# Patient Record
Sex: FEMALE | Race: WHITE | NOT HISPANIC OR LATINO | Employment: OTHER | URBAN - METROPOLITAN AREA
[De-identification: names, ages, dates, MRNs, and addresses within clinical notes are randomized per-mention and may not be internally consistent; named-entity substitution may affect disease eponyms.]

---

## 2017-01-01 ENCOUNTER — TELEPHONE (OUTPATIENT)
Dept: RHEUMATOLOGY | Facility: CLINIC | Age: 72
End: 2017-01-01

## 2017-01-01 ENCOUNTER — PATIENT MESSAGE (OUTPATIENT)
Dept: RHEUMATOLOGY | Facility: CLINIC | Age: 72
End: 2017-01-01

## 2017-01-01 ENCOUNTER — OFFICE VISIT (OUTPATIENT)
Dept: RHEUMATOLOGY | Facility: CLINIC | Age: 72
End: 2017-01-01
Payer: MEDICARE

## 2017-01-01 ENCOUNTER — LAB VISIT (OUTPATIENT)
Dept: LAB | Facility: HOSPITAL | Age: 72
End: 2017-01-01
Attending: INTERNAL MEDICINE
Payer: MEDICARE

## 2017-01-01 VITALS
HEART RATE: 72 BPM | DIASTOLIC BLOOD PRESSURE: 70 MMHG | HEIGHT: 62 IN | WEIGHT: 119.25 LBS | SYSTOLIC BLOOD PRESSURE: 140 MMHG | BODY MASS INDEX: 21.94 KG/M2

## 2017-01-01 DIAGNOSIS — R53.83 MALAISE AND FATIGUE: ICD-10-CM

## 2017-01-01 DIAGNOSIS — R53.81 MALAISE AND FATIGUE: ICD-10-CM

## 2017-01-01 DIAGNOSIS — M05.79 RHEUMATOID ARTHRITIS INVOLVING MULTIPLE SITES WITH POSITIVE RHEUMATOID FACTOR: Primary | ICD-10-CM

## 2017-01-01 DIAGNOSIS — J44.9 CHRONIC OBSTRUCTIVE PULMONARY DISEASE, UNSPECIFIED COPD TYPE: ICD-10-CM

## 2017-01-01 DIAGNOSIS — M05.711 RHEUMATOID ARTHRITIS INVOLVING RIGHT SHOULDER WITH POSITIVE RHEUMATOID FACTOR: ICD-10-CM

## 2017-01-01 DIAGNOSIS — M05.79 RHEUMATOID ARTHRITIS INVOLVING MULTIPLE SITES WITH POSITIVE RHEUMATOID FACTOR: ICD-10-CM

## 2017-01-01 DIAGNOSIS — E11.51 TYPE 2 DIABETES MELLITUS WITH DIABETIC PERIPHERAL ANGIOPATHY WITHOUT GANGRENE, UNSPECIFIED LONG TERM INSULIN USE STATUS: ICD-10-CM

## 2017-01-01 LAB
HAV IGM SERPL QL IA: NEGATIVE
HBV CORE IGM SERPL QL IA: NEGATIVE
HBV SURFACE AG SERPL QL IA: NEGATIVE
HCV AB SERPL QL IA: NEGATIVE

## 2017-01-01 PROCEDURE — 99214 OFFICE O/P EST MOD 30 MIN: CPT | Mod: S$PBB,,, | Performed by: INTERNAL MEDICINE

## 2017-01-01 PROCEDURE — 99999 PR PBB SHADOW E&M-EST. PATIENT-LVL III: CPT | Mod: PBBFAC,,, | Performed by: INTERNAL MEDICINE

## 2017-01-01 PROCEDURE — 36415 COLL VENOUS BLD VENIPUNCTURE: CPT | Mod: PO

## 2017-01-01 PROCEDURE — 99213 OFFICE O/P EST LOW 20 MIN: CPT | Mod: PBBFAC,PO | Performed by: INTERNAL MEDICINE

## 2017-01-01 PROCEDURE — 80074 ACUTE HEPATITIS PANEL: CPT

## 2017-01-01 RX ORDER — NAPROXEN SODIUM 220 MG
TABLET ORAL
Refills: 6 | COMMUNITY
Start: 2016-01-01 | End: 2017-01-01 | Stop reason: SDUPTHER

## 2017-01-01 RX ORDER — LEFLUNOMIDE 20 MG/1
20 TABLET ORAL DAILY
Qty: 30 TABLET | Refills: 3 | Status: SHIPPED | OUTPATIENT
Start: 2017-01-01 | End: 2017-01-01

## 2017-01-01 RX ORDER — LEFLUNOMIDE 20 MG/1
TABLET ORAL
Qty: 30 TABLET | Refills: 3 | Status: SHIPPED | OUTPATIENT
Start: 2017-01-01 | End: 2017-01-01

## 2017-01-01 RX ORDER — OXYCODONE HYDROCHLORIDE 15 MG/1
TABLET ORAL
Refills: 0 | COMMUNITY
Start: 2016-01-01 | End: 2017-01-01

## 2017-01-01 RX ORDER — LEFLUNOMIDE 20 MG/1
20 TABLET ORAL DAILY
Qty: 30 TABLET | Refills: 3 | Status: SHIPPED | OUTPATIENT
Start: 2017-01-01 | End: 2017-01-01 | Stop reason: SDUPTHER

## 2017-01-01 RX ORDER — LEFLUNOMIDE 20 MG/1
TABLET ORAL
Qty: 30 TABLET | Refills: 3 | Status: SHIPPED | OUTPATIENT
Start: 2017-01-01 | End: 2017-01-01 | Stop reason: SDUPTHER

## 2017-01-01 RX ORDER — HYDROXYCHLOROQUINE SULFATE 200 MG/1
TABLET, FILM COATED ORAL
Qty: 60 TABLET | Refills: 3 | Status: SHIPPED | OUTPATIENT
Start: 2017-01-01

## 2017-01-01 ASSESSMENT — ROUTINE ASSESSMENT OF PATIENT INDEX DATA (RAPID3)
MDHAQ FUNCTION SCORE: .6
PAIN SCORE: 4
PATIENT GLOBAL ASSESSMENT SCORE: 4
PSYCHOLOGICAL DISTRESS SCORE: 0
TOTAL RAPID3 SCORE: 3.33

## 2017-01-16 NOTE — MR AVS SNAPSHOT
[facility]  [address]  [facility] [address]  Phone: [phone]  Fax: [phone]                  Elaine PACHECO Hernan   2017 3:00 PM   Office Visit    Description:  Female : 1945   Provider:  Nirali Real DO   Department:  [place] - Rheumatology           Reason for Visit     Disease Management           Diagnoses this Visit        Comments    Rheumatoid arthritis involving multiple sites with positive rheumatoid factor    -  Primary     Malaise and fatigue         Chronic obstructive pulmonary disease, unspecified COPD type         Type 2 diabetes mellitus with diabetic peripheral angiopathy without gangrene, unspecified long term insulin use status                To Do List           Future Appointments        Provider Department Dept Phone    3/16/2017 1:00 PM [facility], [place] [facility] [phone]    2017 10:30 AM Nirali Real DO [facility] [phone]      Goals (5 Years of Data)     None      Follow-Up and Disposition     Return in about 3 months (around 2017).       These Medications        Disp Refills Start End    leflunomide (ARAVA) 20 MG Tab 30 tablet 3 2017 2/15/2017    Take 1 tablet (20 mg total) by mouth once daily. - Oral    Pharmacy: [facility] [address] [place] [address] AT [facility] Ph #: [phone]         [facility]     [facility] Care [facility] -  Assistance  Registered nurses in the [facility] provide clinical advisement, health education, appointment booking, and other advisory services.  Call for this free service at [phone].             Medications           Message regarding Medications     Verify the changes and/or additions to your medication regime listed below are the same as discussed with your clinician today.  If any of these changes or additions are incorrect, please notify your healthcare  "provider.        START taking these NEW medications        Refills    leflunomide (ARAVA) 20 MG Tab 3    Sig: Take 1 tablet (20 mg total) by mouth once daily.    Class: Normal    Route: Oral      STOP taking these medications     oxycodone-acetaminophen (PERCOCET)  mg per tablet Take 1 tablet by mouth Daily.            Verify that the below list of medications is an accurate representation of the medications you are currently taking.  If none reported, the list may be blank. If incorrect, please contact your healthcare provider. Carry this list with you in case of emergency.           Current Medications     amitriptyline (ELAVIL) 150 MG Tab Take 1 tablet (150 mg total) by mouth every evening.    ammonium lactate (LAC-HYDRIN) 12 % lotion JOSSIE TO FEET D    atorvastatin (LIPITOR) 10 MG tablet Take 10 mg by mouth once daily.     BD INSULIN SYRINGE ULTRA-FINE 1 mL 31 x 5/16" Syrg AS DIRECTED    BD INSULIN SYRINGE ULTRA-FINE 1/2 mL 30 gauge x 1/2" Syrg USE UTD    clopidogrel (PLAVIX) 75 mg tablet Take 75 mg by mouth once daily. P.m.    diazepam (VALIUM) 5 MG tablet Take 5 mg by mouth every 12 (twelve) hours as needed.     digoxin (DIGOX) 125 mcg tablet Take 1 tablet (125 mcg total) by mouth once daily.    DILT- mg CDCR TAKE 1 CAPSULE BY MOUTH DAILY    diphenoxylate-atropine 2.5-0.025 mg (LOMOTIL) 2.5-0.025 mg per tablet Take 1 tablet by mouth 4 (four) times daily as needed for Diarrhea.    ECOTRIN LOW STRENGTH 81 mg EC tablet Take 81 mg by mouth once daily. A.m.    fenofibrate (TRICOR) 145 MG tablet TAKE ONE TABLET BY MOUTH EVERY DAY    FLORANEX 1 million cell Tab Take 1 tablet by mouth 3 (three) times daily with meals.    fluticasone-salmeterol 250-50 mcg/dose (ADVAIR DISKUS) 250-50 mcg/dose diskus inhaler INHALE 1 PUFF BY MOUTH EVERY 12 HOURS    HUMULIN R 100 unit/mL injection INJECT 4 TO 9 UNITS BEFORE EACH MEAL    hydroxychloroquine (PLAQUENIL) 200 mg tablet Take 1 tablet (200 mg total) by mouth 2 (two) " "times daily.    insulin syringe-needle U-100 0.5 mL 31 gauge x 5/16 Syrg USE TID    isosorbide mononitrate (IMDUR) 30 MG 24 hr tablet TAKE 1 TABLET BY MOUTH DAILY    LANTUS 100 unit/mL injection 60 UNITS SUBCUTANEOUSLY EVERY EVENING    levalbuterol (XOPENEX HFA) 45 mcg/actuation inhaler Inhale 1-2 puffs into the lungs every 4 (four) hours as needed for Wheezing.    whtyoybkm-[id number]-algal oil 3 mg-35 mg-2 mg -90.314 mg Cap Take 1 tablet by mouth 2 (two) times daily.    lisinopril (PRINIVIL,ZESTRIL) 20 MG tablet TAKE 1 TABLET BY MOUTH EVERY DAY    magnesium oxide (MAG-OX) 400 mg tablet TAKE 2 TABLETS(800 MG) BY MOUTH TWICE DAILY    mesalamine (ASACOL) 800 mg TbEC Take 1 tablet (800 mg total) by mouth 3 (three) times daily.    morphine (MS CONTIN) 60 MG 12 hr tablet Take 60 mg by mouth 2 (two) times daily.     omeprazole (PRILOSEC) 20 MG capsule TAKE ONE CAPSULE BY MOUTH EVERY DAY    oxycodone (ROXICODONE) 15 MG Tab TAKE 1 TABLET PO TID    temazepam (RESTORIL) 30 mg capsule TAKE ONE CAPSULE BY MOUTH EVERY NIGHT AT BEDTIME AS NEEDED    TRUE METRIX GLUCOSE TEST STRIP Strp TEST FOUR TIMES DAILY    albuterol (PROAIR HFA) 90 mcg/actuation inhaler Inhale 2 puffs into the lungs every 4 (four) hours as needed for Shortness of Breath.    leflunomide (ARAVA) 20 MG Tab Take 1 tablet (20 mg total) by mouth once daily.           Clinical Reference Information           Vital Signs - Last Recorded  Most recent update: 1/16/2017  3:01 PM by Mary Laboy LPN    BP Pulse Ht Wt BMI    (!) 140/70 72 5' 2" (1.575 m) 54.1 kg (119 lb 4.3 oz) 21.81 kg/m2      Blood Pressure          Most Recent Value    BP  (!)  140/70      Allergies as of 1/16/2017     Shellfish Containing Products    Shrimp    Vancomycin Analogues    Cymbalta [Duloxetine]    Lyrica [Pregabalin]    Neurontin [Gabapentin]      Immunizations Administered on Date of Encounter - 1/16/2017     None      Orders Placed During Today's Visit     Future Labs/Procedures " Expected by Expires    Hepatitis panel, acute  1/16/2017 12/7/2017    Recurring Lab Work Interval Expires    C-reactive protein   1/16/2018    CBC auto differential   1/16/2018    Comprehensive metabolic panel   1/16/2018    Sedimentation rate, manual   1/16/2018      Smoking Cessation     If you would like to quit smoking:   You may be eligible for free services if you are a [place] resident and started smoking cigarettes before September 1, 1988.  Call the Smoking Cessation Trust (SCT) toll free at ([phone] or [phone].   Call 8-802-QUIT-NOW if you do not meet the above criteria.

## 2017-01-16 NOTE — PROGRESS NOTES
Subjective:          Chief Complaint: Elaine Becerra is a 71 y.o. female who had concerns including Disease Management.    HPI:    Patient here for consultation f/u of sero+ RA with nodules. Started HCQ in interim tolerating this well but persistent hand stiffness and pain.    Stiffness in AM for 1 hour. Stenosing tenosynovitis left 2,3 finger.   Nodules are somewhat bothersome.      To retierate:   Patient is a 71-year-old female with a past medical history of COPD, coronary artery disease hypertension hyperlipidemia, ulcerative colitis, inflammatory arthritis diabetes mellitus with neuropathy and chronic pain.   She was recently seen at the [facility] emergency room and referred for polyarthritis  She follows with Dr. Cordon for chronic pain on oxycodone.-Dr. Cordon  regarding  ulcerative colitis she is on Asacol (mesalamine).   Patient is a history of left leg stenting, coronary angioplasty and amputation of the right finger in her right great toe.  Is a current smokerPatient's most recent labs within our [employer] system are from March 2016 normal renal function elevated blood sugar.  Microalbuminemia.   Previous cardiologist: Dr. Novak    She was seen at [facility] approx 2 months ago for cardiac complaint noted to have nodules of her hands in MCP, PIP and olecranon bursa. She notes right dorsal wrist swelling. No renal stones. No known hx of gout. States she can not longer bend her finger states it has been rather gradual over the years. She has significant neuropathy of her hands and feet states she does not notice some of pain. She is vague with AM stiffness, family (daughter) with her states she does complain of stiffness and weakness.   No known family hx of RA. Patient denies weight loss, rashes, dry eye, dry mouth, nasal or palatal ulcerations,  lymphadenopathy, Raynaud's, hx of DVT/miscarriages, psoriasis or family hx of psoriasis, rashes, serositis, anemia or other constitutional symptoms.  She has  had her olecranon bursa drained in past, never sent for studies.     Component      Latest Ref Rng & Units 11/10/2016   Rheumatoid Factor      0.0 - 15.0 IU/mL 548.0 (H)   CCP Antibodies      <5.0 U/mL 269.2 (H)   Sed Rate      0 - 20 mm/Hr 64 (H)   CRP      0.0 - 8.2 mg/L 4.6     REVIEW OF SYSTEMS:    Review of Systems   Constitutional: Negative for fever, malaise/fatigue and weight loss.   HENT: Negative for sore throat.    Eyes: Negative for double vision, photophobia and redness.   Respiratory: Negative for cough, shortness of breath and wheezing.    Cardiovascular: Negative for chest pain, palpitations and orthopnea.   Gastrointestinal: Negative for abdominal pain, constipation and diarrhea.   Genitourinary: Negative for dysuria, hematuria and urgency.   Musculoskeletal: Positive for joint pain and myalgias. Negative for back pain.   Skin: Negative for rash.   Neurological: Negative for dizziness, tingling, focal weakness and headaches.   Endo/Heme/Allergies: Does not bruise/bleed easily.   Psychiatric/Behavioral: Negative for depression, hallucinations and suicidal ideas.               Objective:            Past Medical History   Diagnosis Date    COPD (chronic obstructive pulmonary disease)     Coronary artery disease     Diverticulosis     Foot ulcer     GERD (gastroesophageal reflux disease)     Head trauma     History of chicken pox     Hyperlipidemia     Hypomagnesemia     Hyponatremia     Insomnia     Memory loss     Neuropathy, peripheral     Nicotine dependence     Normocytic anemia     Peripheral vascular disease     Type II diabetes mellitus with neurological manifestations      Family History   Problem Relation Age of Onset    Adopted: Yes    Family history unknown: Yes     Social History   Substance Use Topics    Smoking status: Current Every Day Smoker     Packs/day: 2.00     Start date: 5/23/1957    Smokeless tobacco: Never Used      Comment: started smoking at age 13-14     "Alcohol use No         Current Outpatient Prescriptions on File Prior to Visit   Medication Sig Dispense Refill    amitriptyline (ELAVIL) 150 MG Tab Take 1 tablet (150 mg total) by mouth every evening. 90 tablet 3    ammonium lactate (LAC-HYDRIN) 12 % lotion JOSSIE TO FEET D  0    atorvastatin (LIPITOR) 10 MG tablet Take 10 mg by mouth once daily.   5    BD INSULIN SYRINGE ULTRA-FINE 1 mL 31 x 5/16" Syrg AS DIRECTED 100 each 6    BD INSULIN SYRINGE ULTRA-FINE 1/2 mL 30 gauge x 1/2" Syrg USE UTD  5    clopidogrel (PLAVIX) 75 mg tablet Take 75 mg by mouth once daily. P.m.  2    diazepam (VALIUM) 5 MG tablet Take 5 mg by mouth every 12 (twelve) hours as needed.   1    digoxin (DIGOX) 125 mcg tablet Take 1 tablet (125 mcg total) by mouth once daily. 90 tablet 3    DILT- mg CDCR TAKE 1 CAPSULE BY MOUTH DAILY 30 capsule 0    diphenoxylate-atropine 2.5-0.025 mg (LOMOTIL) 2.5-0.025 mg per tablet Take 1 tablet by mouth 4 (four) times daily as needed for Diarrhea. 30 tablet 1    ECOTRIN LOW STRENGTH 81 mg EC tablet Take 81 mg by mouth once daily. A.m.  2    fenofibrate (TRICOR) 145 MG tablet TAKE ONE TABLET BY MOUTH EVERY DAY 30 tablet 11    FLORANEX 1 million cell Tab Take 1 tablet by mouth 3 (three) times daily with meals. 90 tablet 11    fluticasone-salmeterol 250-50 mcg/dose (ADVAIR DISKUS) 250-50 mcg/dose diskus inhaler INHALE 1 PUFF BY MOUTH EVERY 12 HOURS 60 each 1    HUMULIN R 100 unit/mL injection INJECT 4 TO 9 UNITS BEFORE EACH MEAL 10 mL 0    hydroxychloroquine (PLAQUENIL) 200 mg tablet Take 1 tablet (200 mg total) by mouth 2 (two) times daily. 60 tablet 6    isosorbide mononitrate (IMDUR) 30 MG 24 hr tablet TAKE 1 TABLET BY MOUTH DAILY 30 tablet 11    LANTUS 100 unit/mL injection 60 UNITS SUBCUTANEOUSLY EVERY EVENING 50 mL 4    levalbuterol (XOPENEX HFA) 45 mcg/actuation inhaler Inhale 1-2 puffs into the lungs every 4 (four) hours as needed for Wheezing. 1 Inhaler 1    " xkwfwklne-[id number]-algal oil 3 mg-35 mg-2 mg -90.314 mg Cap Take 1 tablet by mouth 2 (two) times daily. 180 capsule 3    lisinopril (PRINIVIL,ZESTRIL) 20 MG tablet TAKE 1 TABLET BY MOUTH EVERY DAY 30 tablet 11    magnesium oxide (MAG-OX) 400 mg tablet TAKE 2 TABLETS(800 MG) BY MOUTH TWICE DAILY 120 tablet 6    mesalamine (ASACOL) 800 mg TbEC Take 1 tablet (800 mg total) by mouth 3 (three) times daily. 90 tablet 1    morphine (MS CONTIN) 60 MG 12 hr tablet Take 60 mg by mouth 2 (two) times daily.   0    omeprazole (PRILOSEC) 20 MG capsule TAKE ONE CAPSULE BY MOUTH EVERY DAY 30 capsule 11    temazepam (RESTORIL) 30 mg capsule TAKE ONE CAPSULE BY MOUTH EVERY NIGHT AT BEDTIME AS NEEDED 30 capsule 0    TRUE METRIX GLUCOSE TEST STRIP Strp TEST FOUR TIMES DAILY 200 strip 0    [DISCONTINUED] clopidogrel (PLAVIX) 75 mg tablet TAKE 1 TABLET BY MOUTH EVERY DAY. DISCARD REMAINDER SAID 30 tablet 0    [DISCONTINUED] oxycodone-acetaminophen (PERCOCET)  mg per tablet Take 1 tablet by mouth Daily.   0    albuterol (PROAIR HFA) 90 mcg/actuation inhaler Inhale 2 puffs into the lungs every 4 (four) hours as needed for Shortness of Breath. 1 Inhaler 11     No current facility-administered medications on file prior to visit.        Vitals:    01/16/17 1456   BP: (!) 140/70   Pulse: 72       Physical Exam:    Physical Exam   Constitutional: She is oriented to person, place, and time. She appears well-developed and well-nourished.   HENT:   Head: Normocephalic and atraumatic.   Mouth/Throat: Oropharynx is clear and moist.   Eyes: EOM are normal. Pupils are equal, round, and reactive to light.   Neck: Normal range of motion.   Cardiovascular: Normal rate, regular rhythm and normal heart sounds.    Pulmonary/Chest: Effort normal and breath sounds normal.   Musculoskeletal:        Right shoulder: She exhibits normal range of motion, no tenderness and no swelling.        Left shoulder: She exhibits normal range of motion, no  tenderness and no swelling.        Right elbow: She exhibits swelling. She exhibits normal range of motion and no effusion. No tenderness found.        Left elbow: She exhibits swelling. She exhibits normal range of motion and no effusion. No tenderness found.        Right wrist: She exhibits normal range of motion, no tenderness and no swelling.        Left wrist: She exhibits normal range of motion, no tenderness and no swelling.        Right knee: She exhibits normal range of motion and no swelling. No tenderness found.        Left knee: She exhibits normal range of motion and no swelling. No tenderness found.        Right hand: She exhibits decreased range of motion and swelling. She exhibits no tenderness.        Left hand: She exhibits decreased range of motion, tenderness and swelling.        Right foot: There is normal range of motion, no tenderness and no swelling.        Left foot: There is normal range of motion, no tenderness and no swelling.   Patient with MCP nodules dense synovitis she has dictation of the second right finger as well as right toe  Patient has found consistency swelling at the lateral olecranon right greater than left     Neurological: She is alert and oriented to person, place, and time.   Skin: Skin is warm and dry.   Psychiatric: She has a normal mood and affect. Her behavior is normal.             Assessment:       Encounter Diagnoses   Name Primary?    Rheumatoid arthritis involving multiple sites with positive rheumatoid factor Yes    Malaise and fatigue     Chronic obstructive pulmonary disease, unspecified COPD type     Type 2 diabetes mellitus with diabetic peripheral angiopathy without gangrene, unspecified long term insulin use status           Plan:        Rheumatoid arthritis involving multiple sites with positive rheumatoid factor  -     Hepatitis panel, acute; Future; Expected date: 1/16/17  -     leflunomide (ARAVA) 20 MG Tab; Take 1 tablet (20 mg total) by mouth  once daily.  Dispense: 30 tablet; Refill: 3  -     CBC auto differential; Standing; Expected date: 1/16/17  -     Comprehensive metabolic panel; Standing; Expected date: 1/16/17  -     Sedimentation rate, manual; Standing; Expected date: 1/16/17  -     C-reactive protein; Standing; Expected date: 1/16/17    Malaise and fatigue  -     Hepatitis panel, acute; Future; Expected date: 1/16/17  -     leflunomide (ARAVA) 20 MG Tab; Take 1 tablet (20 mg total) by mouth once daily.  Dispense: 30 tablet; Refill: 3    Chronic obstructive pulmonary disease, unspecified COPD type    Type 2 diabetes mellitus with diabetic peripheral angiopathy without gangrene, unspecified long term insulin use status    Trial with Arava   Discussed the risk,benefit side effects monitoring requirements possible adverse outcomes with leflunomide therapy including but not limited to: Arava is not recommended for patients with severe immunodeficiency, bone marrow dysplasia, or severe, uncontrolled infections. Severe infections including sepsis, which may be fatal, have been reported. Rarely, interstitial lung disease, which may be fatal, has been reported Rare reports of pancytopenia, agranulocytosis, thrombocytopenia, Nava-Miller syndrome, toxic epidermal necrolysis, drug reaction with eosinophilia and systemic symptoms (DRESS), and peripheral neuropathy have been reported in post marketing experience. Pregnancy must be excluded before the start of treatment with ARAVA. ARAVA is contraindicated in pregnant women, or women of childbearing potential who are not using reliable contraception. (see CONTRAINDICATIONS and WARNINGS.) Pregnancy must be avoided during ARAVA treatment or prior to the completion of the drug elimination procedure after ARAVA treatment.       Return in about 3 months (around 4/16/2017).          30min consultation with greater than 50% spent in counseling, chart review and coordination of care. All questions  answered.

## 2017-02-14 NOTE — TELEPHONE ENCOUNTER
----- Message from Arlene Matamoros sent at 2/1/2017  1:15 PM CST -----  Contact: self  Patient called regarding 1 of her prescriptions being stolen. Arthritidis medication that is needed. Please contact [phone] (home)       [facility] [address] [place] [address] AT [address] [address]  Phone: [phone] Fax: [phone]    Patient has Arava on hand, but has not started. She is asking if she can take flu and pneumonia shot. She is asking if the Arava will lower her immune system. Please advise. CG

## 2017-04-11 NOTE — TELEPHONE ENCOUNTER
----- Message from Teresita Diaz sent at 4/11/2017  5:05 PM CDT -----  Contact: Patient  Patient called advising that Dr. Real has ordered multiple blood tests.  However, she has difficulty getting to the doctor's office.  She wanted to know if Dr. Rela could send the lab work to be drawn by her home health nurse at [facility].  She advised her nurse is Ean.  [facility]'s phone number is [phone].      Patient also advised she received a call from Dr. Real's office advising her that she needs an appointment after Easter.  She wanted to discuss this with the nurse to see when they were wanting her to come back.  Please call patient back at [phone] to confirm the appointment information as well as the blood draw information.  Thank you!   4-11-17 Spoke to answering service for [facility] and gave standing orders for labs every 3 months. Patient was due for labs 3-16-17 and cancelled. Call back number is given for the agency nurses to contact us. Spoke to patient earlier about rescheduling from 4-17-17. Patient  states she will call back to reschedule later. SAL

## 2017-04-11 NOTE — TELEPHONE ENCOUNTER
----- Message from RT Rudi sent at 4/11/2017  4:52 PM CDT -----  Contact: pt    Call pod, pt , returned missed call, thanks    Patient states  she has trouble getting a ride. Appointment made and patient will do her best to get transportation. CG

## 2017-05-03 PROBLEM — M05.79 RHEUMATOID ARTHRITIS INVOLVING MULTIPLE SITES WITH POSITIVE RHEUMATOID FACTOR: Status: ACTIVE | Noted: 2017-01-01

## 2017-05-08 PROBLEM — M21.962 ACQUIRED DEFORMITY OF LEFT FOOT: Status: ACTIVE | Noted: 2017-01-01

## 2017-05-08 PROBLEM — M21.961 ACQUIRED DEFORMITY OF RIGHT FOOT: Status: ACTIVE | Noted: 2017-01-01

## 2017-06-12 NOTE — TELEPHONE ENCOUNTER
----- Message from Juany Elena sent at 6/12/2017  3:31 PM CDT -----  Patient has appointment on June 20th at 11:30/requesting appointment around 1:30 instead/please call patient back at [phone] to reschedule or advise.    Patient discussed moving this appointment, but decided to keep it instead of being seen months from now. CG

## 2017-06-19 NOTE — TELEPHONE ENCOUNTER
----- Message from Janet Ling sent at 6/19/2017 12:17 PM CDT -----  Contact: yanira   No transportation for appt tomorrow   Wants to speak to nurse   Call back      Patient is having transportation problems. She is aware we will continue to fill her medications. Will contact [facility] for labs, which are due this month. SAL

## 2017-08-28 PROBLEM — A41.9 SEPSIS: Status: ACTIVE | Noted: 2017-01-01

## 2017-08-28 PROBLEM — K52.9 COLITIS, ACUTE: Status: ACTIVE | Noted: 2017-01-01

## 2017-08-29 PROBLEM — I21.4 NSTEMI (NON-ST ELEVATED MYOCARDIAL INFARCTION): Status: ACTIVE | Noted: 2017-01-01

## 2017-08-30 PROBLEM — B95.62 MRSA BACTEREMIA: Status: ACTIVE | Noted: 2017-01-01

## 2017-08-30 PROBLEM — N17.9 AKI (ACUTE KIDNEY INJURY): Status: ACTIVE | Noted: 2017-01-01

## 2017-08-30 PROBLEM — J81.0 ACUTE PULMONARY EDEMA: Status: ACTIVE | Noted: 2017-01-01

## 2017-08-30 PROBLEM — R78.81 MRSA BACTEREMIA: Status: ACTIVE | Noted: 2017-01-01

## 2017-08-30 PROBLEM — K72.00 SHOCK LIVER: Status: ACTIVE | Noted: 2017-01-01

## 2017-08-30 PROBLEM — I21.02 ST ELEVATION MYOCARDIAL INFARCTION INVOLVING LEFT ANTERIOR DESCENDING (LAD) CORONARY ARTERY: Status: ACTIVE | Noted: 2017-01-01

## 2017-08-30 PROBLEM — I99.8 LOWER LIMB ISCHEMIA: Status: ACTIVE | Noted: 2017-01-01

## 2017-08-30 PROBLEM — J96.01 ACUTE HYPOXEMIC RESPIRATORY FAILURE: Status: ACTIVE | Noted: 2017-01-01

## 2017-08-30 PROBLEM — D69.6 THROMBOCYTOPENIA, UNSPECIFIED: Status: ACTIVE | Noted: 2017-01-01

## 2017-08-30 PROBLEM — E87.6 HYPOKALEMIA: Status: ACTIVE | Noted: 2017-01-01

## 2017-08-30 PROBLEM — I50.21 ACUTE SYSTOLIC CONGESTIVE HEART FAILURE: Status: ACTIVE | Noted: 2017-01-01

## 2017-09-01 PROBLEM — A41.9 SEPSIS: Status: RESOLVED | Noted: 2017-01-01 | Resolved: 2017-01-01

## 2017-09-04 PROBLEM — I35.0 AORTIC STENOSIS, MODERATE: Status: ACTIVE | Noted: 2017-01-01

## 2017-09-05 PROBLEM — D69.6 THROMBOCYTOPENIA, UNSPECIFIED: Status: RESOLVED | Noted: 2017-01-01 | Resolved: 2017-01-01

## 2017-09-05 PROBLEM — Z51.5 PALLIATIVE CARE ENCOUNTER: Status: ACTIVE | Noted: 2017-01-01

## 2017-09-05 PROBLEM — E87.6 HYPOKALEMIA: Status: RESOLVED | Noted: 2017-01-01 | Resolved: 2017-01-01

## 2017-09-06 PROBLEM — J96.20 RESPIRATORY FAILURE, ACUTE-ON-CHRONIC: Status: ACTIVE | Noted: 2017-01-01

## 2017-09-06 PROBLEM — T14.8XXA WOUND DRAINAGE: Status: ACTIVE | Noted: 2017-01-01

## 2017-12-04 PROBLEM — J81.0 ACUTE PULMONARY EDEMA: Status: RESOLVED | Noted: 2017-01-01 | Resolved: 2017-12-04
